# Patient Record
Sex: FEMALE | Race: WHITE | URBAN - NONMETROPOLITAN AREA
[De-identification: names, ages, dates, MRNs, and addresses within clinical notes are randomized per-mention and may not be internally consistent; named-entity substitution may affect disease eponyms.]

---

## 2021-08-02 ENCOUNTER — OFFICE VISIT (OUTPATIENT)
Dept: OBGYN CLINIC | Age: 66
End: 2021-08-02
Payer: COMMERCIAL

## 2021-08-02 ENCOUNTER — HOSPITAL ENCOUNTER (OUTPATIENT)
Age: 66
Setting detail: SPECIMEN
Discharge: HOME | End: 2021-08-02
Payer: COMMERCIAL

## 2021-08-02 VITALS
HEIGHT: 65 IN | BODY MASS INDEX: 21.29 KG/M2 | DIASTOLIC BLOOD PRESSURE: 74 MMHG | WEIGHT: 127.8 LBS | HEART RATE: 86 BPM | SYSTOLIC BLOOD PRESSURE: 166 MMHG

## 2021-08-02 DIAGNOSIS — N89.8 VAGINAL DISCHARGE: Primary | ICD-10-CM

## 2021-08-02 LAB
DIRECT EXAM: NORMAL
Lab: NORMAL
SPECIMEN DESCRIPTION: NORMAL

## 2021-08-02 PROCEDURE — 3017F COLORECTAL CA SCREEN DOC REV: CPT | Performed by: NURSE PRACTITIONER

## 2021-08-02 PROCEDURE — G8420 CALC BMI NORM PARAMETERS: HCPCS | Performed by: NURSE PRACTITIONER

## 2021-08-02 PROCEDURE — G8427 DOCREV CUR MEDS BY ELIG CLIN: HCPCS | Performed by: NURSE PRACTITIONER

## 2021-08-02 PROCEDURE — 99213 OFFICE O/P EST LOW 20 MIN: CPT | Performed by: NURSE PRACTITIONER

## 2021-08-02 PROCEDURE — 1036F TOBACCO NON-USER: CPT | Performed by: NURSE PRACTITIONER

## 2021-08-02 RX ORDER — ENALAPRIL MALEATE AND HYDROCHLOROTHIAZIDE 10; 25 MG/1; MG/1
TABLET ORAL
COMMUNITY
Start: 2021-07-29

## 2021-08-02 RX ORDER — LANOLIN ALCOHOL/MO/W.PET/CERES
CREAM (GRAM) TOPICAL
COMMUNITY

## 2021-08-02 RX ORDER — BIOTIN 5 MG
1 TABLET ORAL
COMMUNITY

## 2021-08-02 NOTE — PROGRESS NOTES
Genitourinary:     General: Normal vulva. Vagina: Vaginal discharge (copius tan discharge ) present. No erythema. Cervix: Normal.      Uterus: Not tender. Adnexa:         Right: No tenderness. Left: No tenderness. Musculoskeletal:         General: Normal range of motion. Neurological:      General: No focal deficit present. Mental Status: She is alert. Psychiatric:         Mood and Affect: Mood normal.         Behavior: Behavior normal.       Chaperone: not present    Assessment and Plan          Diagnosis Orders   1. Vaginal discharge  VAGINITIS DNA PROBE     Discussed due to color of discharge, amount and duration I would reccommed gyn ultrasound to assure lining is not thickened. Patient reports she was seen last Monday by PCP for BP check and reading was 120's, encouraged to call and schedule follow up. I am having Leilani Letters maintain her [employer], calcium citrate-vitamin D, and enalapril-hydroCHLOROthiazide. Return for gyn US. There are no Patient Instructions on file for this visit.     NGUYEN Davis NP,8/4/2021 9:15 AM

## 2021-08-04 ENCOUNTER — OFFICE VISIT (OUTPATIENT)
Dept: OBGYN CLINIC | Age: 66
End: 2021-08-04
Payer: COMMERCIAL

## 2021-08-04 VITALS — BODY MASS INDEX: 21.13 KG/M2 | DIASTOLIC BLOOD PRESSURE: 76 MMHG | WEIGHT: 127 LBS | SYSTOLIC BLOOD PRESSURE: 152 MMHG

## 2021-08-04 DIAGNOSIS — N95.0 POSTMENOPAUSAL BLEEDING: ICD-10-CM

## 2021-08-04 DIAGNOSIS — R93.89 ENDOMETRIAL THICKENING ON ULTRASOUND: Primary | ICD-10-CM

## 2021-08-04 PROCEDURE — G8427 DOCREV CUR MEDS BY ELIG CLIN: HCPCS | Performed by: NURSE PRACTITIONER

## 2021-08-04 PROCEDURE — 99213 OFFICE O/P EST LOW 20 MIN: CPT | Performed by: NURSE PRACTITIONER

## 2021-08-04 PROCEDURE — 1036F TOBACCO NON-USER: CPT | Performed by: NURSE PRACTITIONER

## 2021-08-04 PROCEDURE — G8420 CALC BMI NORM PARAMETERS: HCPCS | Performed by: NURSE PRACTITIONER

## 2021-08-04 PROCEDURE — 3017F COLORECTAL CA SCREEN DOC REV: CPT | Performed by: NURSE PRACTITIONER

## 2021-08-04 NOTE — PROGRESS NOTES
PROBLEM VISIT     Date of service: 2021    Marlen Van  Is a 63 y.o. female    PT's PCP is: Chica Land MD     : 1955                                             Subjective:       No LMP recorded. Patient is postmenopausal.   OB History    Para Term  AB Living   3 3 0 0 0 3   SAB TAB Ectopic Molar Multiple Live Births   0 0 0 0 0 3      # Outcome Date GA Lbr Artur/2nd Weight Sex Delivery Anes PTL Lv   3 Para            2 Para            1 Para                 Social History     Tobacco Use   Smoking Status Never Smoker   Smokeless Tobacco Never Used        Social History     Substance and Sexual Activity   Alcohol Use Yes       Allergies: Morphine      Current Outpatient Medications:     Krill Oil 1000 MG CAPS, Take 1 capsule by mouth, Disp: , Rfl:     calcium citrate-vitamin D (CITRACAL+D) 315-200 MG-UNIT per tablet, Take by mouth, Disp: , Rfl:     enalapril-hydroCHLOROthiazide (VASERETIC) 10-25 MG per tablet, , Disp: , Rfl:     Social History     Substance and Sexual Activity   Sexual Activity Not on file     Chief Complaint   Patient presents with    Follow-up     Follow up usn for PMB. PE:  Vital Signs  Blood pressure (!) 152/76, weight 127 lb (57.6 kg). HPI: Patient presents today following ultrasound due to PMB/brown discharge. Denies any changes since last visit. PT denies fever, chills, nausea and vomiting       Review of Systems   Constitutional: Negative. Genitourinary: Positive for vaginal discharge. Negative for dysuria, frequency, pelvic pain and vaginal bleeding. Physical Exam  Constitutional:       Appearance: Normal appearance. HENT:      Head: Normocephalic. Pulmonary:      Effort: Pulmonary effort is normal.   Musculoskeletal:         General: Normal range of motion. Neurological:      General: No focal deficit present. Mental Status: She is alert.    Psychiatric:         Mood and Affect: Mood normal.         Behavior: Behavior normal.         Assessment and Plan          Diagnosis Orders   1. Endometrial thickening on ultrasound     2. Postmenopausal bleeding       Uterus fabiano 6.5 x 5.1 x 4.8cm, thickened fluid-filled endometrium 8.3mm, bilateral ovaries removed    Reviewed ultrasound findings, discussed endometrial bx in office verse D&C for further evaluation of thickened endometrium. Limitations/benefits reviewed. Patient elects for endo bx. She declines this today and would like to schedule for another day. Encouraged ibuprofen 800mg within hour of procedure. Brought pictures of all home BP reading which were 120's. States being here makes her anxious. Encouraged close monitoring. I am having Ciro Centeno maintain her [employer], calcium citrate-vitamin D, and enalapril-hydroCHLOROthiazide. Return for endo bx. There are no Patient Instructions on file for this visit. Over 50% of time spent on counseling and care coordination on: see assessment and plan,  She was also counseled on her preventative health maintenance recommendations and follow-up.         FF time: 20 min      NGUYEN Oscar NP,8/4/2021 10:39 AM

## 2021-08-10 ENCOUNTER — PROCEDURE VISIT (OUTPATIENT)
Dept: OBGYN CLINIC | Age: 66
End: 2021-08-10
Payer: COMMERCIAL

## 2021-08-10 VITALS — BODY MASS INDEX: 21.13 KG/M2 | SYSTOLIC BLOOD PRESSURE: 134 MMHG | DIASTOLIC BLOOD PRESSURE: 77 MMHG | WEIGHT: 127 LBS

## 2021-08-10 DIAGNOSIS — R93.89 ENDOMETRIAL THICKENING ON ULTRASOUND: Primary | ICD-10-CM

## 2021-08-10 DIAGNOSIS — N95.0 POSTMENOPAUSAL BLEEDING: ICD-10-CM

## 2021-08-10 PROCEDURE — 58100 BIOPSY OF UTERUS LINING: CPT | Performed by: NURSE PRACTITIONER

## 2021-08-10 RX ORDER — CEPHALEXIN 500 MG/1
500 CAPSULE ORAL 2 TIMES DAILY
Qty: 14 CAPSULE | Refills: 0 | Status: SHIPPED | OUTPATIENT
Start: 2021-08-10 | End: 2021-09-01 | Stop reason: ALTCHOICE

## 2021-08-10 NOTE — PROGRESS NOTES
Flavio Wang, A 63y.o. year old female presents to the office for an Endometrial Biopsy. /77   Wt 127 lb (57.6 kg)   BMI 21.13 kg/m²     Reason For Biopsy: PMB, endometrial thickening on ultrasound     EMB: performed today - procedure explained to pt - verbal consent obtained     Procedure:      A speculum was paced in vaginal canal and the cervix was visualized. The cervix was then prepped with betadine x 3. A tenaculum was used to stabilizes the cervix. Cervical dilators were used due to cervical stenosis. The uterus was measured to be 7 cm with a uterine sound. When Pipette used to obtain specimen but no specimen was obtained. The Pipette created suction that was hard to disconnect and still did not yield specimen. fdPt tolerated the procedure well. Assessment:          Plan:   1. Warning signs reviewed such as cramping and spotting. 2. Encouraged to use ibuprofen 800 mg TID PRN and heat for comfort. 3. Will start Keflex due to concerns with possible perforation. 4. Anticipate follow up with: D&C for endometrial sampling.  Will call back with her decision as she would like to discuss options with her

## 2021-08-13 ENCOUNTER — TELEPHONE (OUTPATIENT)
Dept: OBGYN CLINIC | Age: 66
End: 2021-08-13

## 2021-08-13 NOTE — TELEPHONE ENCOUNTER
Considering D&C as endometrial bx was unsuccessful. she is discussing with her .  Just did not want her to fall off radar

## 2021-08-16 ENCOUNTER — TELEPHONE (OUTPATIENT)
Dept: OBGYN CLINIC | Age: 66
End: 2021-08-16

## 2021-08-16 NOTE — TELEPHONE ENCOUNTER
Pt calling stating she was supposed to call and let you know if she decided to do D&C but she would like to know if she could just go ahead and do a hysterectomy? Pt would like you to call her and discuss if able.

## 2021-09-01 ENCOUNTER — OFFICE VISIT (OUTPATIENT)
Dept: OBGYN CLINIC | Age: 66
End: 2021-09-01
Payer: COMMERCIAL

## 2021-09-01 ENCOUNTER — TELEPHONE (OUTPATIENT)
Dept: OBGYN CLINIC | Age: 66
End: 2021-09-01

## 2021-09-01 VITALS — BODY MASS INDEX: 20.92 KG/M2 | WEIGHT: 125.7 LBS | SYSTOLIC BLOOD PRESSURE: 138 MMHG | DIASTOLIC BLOOD PRESSURE: 70 MMHG

## 2021-09-01 DIAGNOSIS — R93.89 ENDOMETRIAL THICKENING ON ULTRASOUND: Primary | ICD-10-CM

## 2021-09-01 DIAGNOSIS — N95.0 POSTMENOPAUSAL BLEEDING: ICD-10-CM

## 2021-09-01 PROCEDURE — G8420 CALC BMI NORM PARAMETERS: HCPCS | Performed by: NURSE PRACTITIONER

## 2021-09-01 PROCEDURE — 1036F TOBACCO NON-USER: CPT | Performed by: NURSE PRACTITIONER

## 2021-09-01 PROCEDURE — 3017F COLORECTAL CA SCREEN DOC REV: CPT | Performed by: NURSE PRACTITIONER

## 2021-09-01 PROCEDURE — 99213 OFFICE O/P EST LOW 20 MIN: CPT | Performed by: NURSE PRACTITIONER

## 2021-09-01 PROCEDURE — G8427 DOCREV CUR MEDS BY ELIG CLIN: HCPCS | Performed by: NURSE PRACTITIONER

## 2021-09-01 NOTE — PROGRESS NOTES
PROBLEM VISIT     Date of service: 2021    Kay Echols  Is a 63 y.o. female    PT's PCP is: Sylvia Kirk MD     : 1955                                             Subjective:       No LMP recorded. Patient is postmenopausal.   OB History    Para Term  AB Living   3 3 3 0 0 3   SAB TAB Ectopic Molar Multiple Live Births   0 0 0 0 0 3      # Outcome Date GA Lbr Artur/2nd Weight Sex Delivery Anes PTL Lv   3 Term            2 Term            1 Term                 Social History     Tobacco Use   Smoking Status Never Smoker   Smokeless Tobacco Never Used        Social History     Substance and Sexual Activity   Alcohol Use Yes       Allergies: Morphine      Current Outpatient Medications:     Krill Oil 1000 MG CAPS, Take 1 capsule by mouth, Disp: , Rfl:     calcium citrate-vitamin D (CITRACAL+D) 315-200 MG-UNIT per tablet, Take by mouth, Disp: , Rfl:     enalapril-hydroCHLOROthiazide (VASERETIC) 10-25 MG per tablet, , Disp: , Rfl:     Social History     Substance and Sexual Activity   Sexual Activity Not on file     Chief Complaint   Patient presents with    Follow-up     Discuss surgical options for endometrial thickening. Discuss possible hyst.         PE:  Vital Signs  Blood pressure 138/70, weight 125 lb 11.2 oz (57 kg). HPI: Patient presents today to discuss alternative options for PMB and endometrial thickening on ultrasound. Endometrial bx was attempted but unable to be completed due to stenotic cervical os. PT denies fever, chills, nausea and vomiting       Review of Systems   Constitutional: Negative. Genitourinary: Positive for vaginal bleeding (brown discharge ). Negative for dysuria, frequency, pelvic pain and vaginal discharge. Physical Exam  Constitutional:       Appearance: Normal appearance. HENT:      Head: Normocephalic. Pulmonary:      Effort: Pulmonary effort is normal.   Musculoskeletal:         General: Normal range of motion.